# Patient Record
Sex: FEMALE | Race: BLACK OR AFRICAN AMERICAN | Employment: OTHER | URBAN - METROPOLITAN AREA
[De-identification: names, ages, dates, MRNs, and addresses within clinical notes are randomized per-mention and may not be internally consistent; named-entity substitution may affect disease eponyms.]

---

## 2017-03-23 ENCOUNTER — ED HISTORICAL/CONVERTED ENCOUNTER (OUTPATIENT)
Dept: OTHER | Age: 52
End: 2017-03-23

## 2017-09-02 ENCOUNTER — ED HISTORICAL/CONVERTED ENCOUNTER (OUTPATIENT)
Dept: OTHER | Age: 52
End: 2017-09-02

## 2017-12-20 ENCOUNTER — ED HISTORICAL/CONVERTED ENCOUNTER (OUTPATIENT)
Dept: OTHER | Age: 52
End: 2017-12-20

## 2017-12-24 ENCOUNTER — ED HISTORICAL/CONVERTED ENCOUNTER (OUTPATIENT)
Dept: OTHER | Age: 52
End: 2017-12-24

## 2017-12-29 ENCOUNTER — HOSPITAL ENCOUNTER (OUTPATIENT)
Dept: GENERAL RADIOLOGY | Age: 52
Discharge: HOME | End: 2017-12-29
Attending: ORTHOPAEDIC SURGERY
Payer: COMMERCIAL

## 2017-12-29 DIAGNOSIS — M25.552 LEFT HIP PAIN: ICD-10-CM

## 2017-12-29 PROCEDURE — 74011636320 HC RX REV CODE- 636/320: Performed by: RADIOLOGY

## 2017-12-29 PROCEDURE — 74011000250 HC RX REV CODE- 250: Performed by: RADIOLOGY

## 2017-12-29 PROCEDURE — 20610 DRAIN/INJ JOINT/BURSA W/O US: CPT

## 2017-12-29 PROCEDURE — 74011250636 HC RX REV CODE- 250/636: Performed by: RADIOLOGY

## 2017-12-29 RX ORDER — TRIAMCINOLONE ACETONIDE 40 MG/ML
40 INJECTION, SUSPENSION INTRA-ARTICULAR; INTRAMUSCULAR
Status: COMPLETED | OUTPATIENT
Start: 2017-12-29 | End: 2017-12-29

## 2017-12-29 RX ORDER — LIDOCAINE HYDROCHLORIDE 10 MG/ML
10 INJECTION INFILTRATION; PERINEURAL
Status: COMPLETED | OUTPATIENT
Start: 2017-12-29 | End: 2017-12-29

## 2017-12-29 RX ORDER — BUPIVACAINE HYDROCHLORIDE 5 MG/ML
5 INJECTION, SOLUTION EPIDURAL; INTRACAUDAL
Status: COMPLETED | OUTPATIENT
Start: 2017-12-29 | End: 2017-12-29

## 2017-12-29 RX ADMIN — LIDOCAINE HYDROCHLORIDE 10 ML: 10 INJECTION, SOLUTION INFILTRATION; PERINEURAL at 12:36

## 2017-12-29 RX ADMIN — BUPIVACAINE HYDROCHLORIDE 25 MG: 5 INJECTION, SOLUTION EPIDURAL; INTRACAUDAL at 12:36

## 2017-12-29 RX ADMIN — TRIAMCINOLONE ACETONIDE 40 MG: 40 INJECTION, SUSPENSION INTRA-ARTICULAR; INTRAMUSCULAR at 12:36

## 2017-12-29 RX ADMIN — IOHEXOL 10 ML: 300 INJECTION, SOLUTION INTRAVENOUS at 12:36

## 2018-01-22 ENCOUNTER — OP HISTORICAL/CONVERTED ENCOUNTER (OUTPATIENT)
Dept: OTHER | Age: 53
End: 2018-01-22

## 2018-09-21 ENCOUNTER — IP HISTORICAL/CONVERTED ENCOUNTER (OUTPATIENT)
Dept: OTHER | Age: 53
End: 2018-09-21

## 2018-10-23 ENCOUNTER — OP HISTORICAL/CONVERTED ENCOUNTER (OUTPATIENT)
Dept: OTHER | Age: 53
End: 2018-10-23

## 2019-12-09 ENCOUNTER — ED HISTORICAL/CONVERTED ENCOUNTER (OUTPATIENT)
Dept: OTHER | Age: 54
End: 2019-12-09

## 2020-01-07 ENCOUNTER — OP HISTORICAL/CONVERTED ENCOUNTER (OUTPATIENT)
Dept: OTHER | Age: 55
End: 2020-01-07

## 2021-02-17 ENCOUNTER — HOSPITAL ENCOUNTER (EMERGENCY)
Age: 56
Discharge: HOME | End: 2021-02-17
Payer: COMMERCIAL

## 2021-02-17 ENCOUNTER — APPOINTMENT (OUTPATIENT)
Dept: GENERAL RADIOLOGY | Age: 56
End: 2021-02-17
Attending: PHYSICIAN ASSISTANT
Payer: COMMERCIAL

## 2021-02-17 VITALS
RESPIRATION RATE: 17 BRPM | HEIGHT: 65 IN | BODY MASS INDEX: 31.65 KG/M2 | OXYGEN SATURATION: 99 % | HEART RATE: 72 BPM | SYSTOLIC BLOOD PRESSURE: 156 MMHG | TEMPERATURE: 97.9 F | DIASTOLIC BLOOD PRESSURE: 84 MMHG | WEIGHT: 190 LBS

## 2021-02-17 DIAGNOSIS — S16.1XXA STRAIN OF CERVICAL PORTION OF RIGHT TRAPEZIUS MUSCLE: Primary | ICD-10-CM

## 2021-02-17 DIAGNOSIS — M62.838 MUSCLE SPASM: ICD-10-CM

## 2021-02-17 DIAGNOSIS — M19.011 PRIMARY OSTEOARTHRITIS OF RIGHT SHOULDER: ICD-10-CM

## 2021-02-17 PROCEDURE — 73030 X-RAY EXAM OF SHOULDER: CPT

## 2021-02-17 PROCEDURE — 99282 EMERGENCY DEPT VISIT SF MDM: CPT

## 2021-02-17 RX ORDER — METHOCARBAMOL 500 MG/1
500 TABLET, FILM COATED ORAL 3 TIMES DAILY
Qty: 15 TAB | Refills: 0 | Status: SHIPPED | OUTPATIENT
Start: 2021-02-17

## 2021-02-17 NOTE — ED TRIAGE NOTES
Pt complains of right shoulder pain for several days, has history of chronic shoulder pain, denies injury

## 2021-02-17 NOTE — ED PROVIDER NOTES
EMERGENCY DEPARTMENT HISTORY AND PHYSICAL EXAM      Date: 2/17/2021  Patient Name: Annice Claude    History of Presenting Illness     Chief Complaint   Patient presents with    Shoulder Pain       History Provided By: Patient    HPI: Annice Claude, 63 y.o. female with a past medical history significant No significant past medical history presents to the ED with cc of right sided neck pain radiating into the right shoulder onset yesterday. Patient reports sleeping on her couch 2 days ago because she lost power and she believes she may have slept on her neck and shoulder in an abnormal way, causing her pain. She denies other injury or trauma. She has been taking naproxen at home with mild relief of her symptoms. She reports worsening of her symptoms with rotation of her head to the right. She denies numbness, tingling, chest pain, shortness of breath, fever. There are no other complaints, changes, or physical findings at this time. PCP: Amari Rizvi NP    No current facility-administered medications on file prior to encounter. No current outpatient medications on file prior to encounter. Past History     Past Medical History:  No past medical history on file. Past Surgical History:  No past surgical history on file. Family History:  No family history on file. Social History:  Social History     Tobacco Use    Smoking status: Not on file   Substance Use Topics    Alcohol use: Not on file    Drug use: Not on file       Allergies:  No Known Allergies      Review of Systems   Review of Systems   Constitutional: Negative for activity change, chills and fever. HENT: Negative for congestion, ear pain, rhinorrhea, sneezing and sore throat. Eyes: Negative for pain and visual disturbance. Respiratory: Negative for cough and shortness of breath. Cardiovascular: Negative for chest pain. Gastrointestinal: Negative for abdominal pain, diarrhea, nausea and vomiting. Genitourinary: Negative for dysuria and hematuria. Musculoskeletal: Positive for arthralgias, myalgias and neck pain. Negative for gait problem. Skin: Negative for rash. Neurological: Negative for speech difficulty, weakness and headaches. Psychiatric/Behavioral: The patient is not nervous/anxious. All other systems reviewed and are negative. Physical Exam   Physical Exam  Vitals signs and nursing note reviewed. Constitutional:       General: She is not in acute distress. Appearance: Normal appearance. She is not toxic-appearing. HENT:      Head: Normocephalic and atraumatic. Nose: Nose normal.      Mouth/Throat:      Mouth: Mucous membranes are moist.   Eyes:      Extraocular Movements: Extraocular movements intact. Conjunctiva/sclera: Conjunctivae normal.      Pupils: Pupils are equal, round, and reactive to light. Neck:      Musculoskeletal: Normal range of motion. Pain with movement and muscular tenderness present. No spinous process tenderness. Cardiovascular:      Rate and Rhythm: Normal rate. Pulses: Normal pulses. Pulmonary:      Effort: Pulmonary effort is normal. No respiratory distress. Musculoskeletal: Normal range of motion. General: No deformity or signs of injury. Right shoulder: She exhibits normal range of motion, no tenderness, no bony tenderness, no deformity, normal pulse and normal strength. Skin:     General: Skin is warm and dry. Capillary Refill: Capillary refill takes less than 2 seconds. Findings: No rash. Neurological:      General: No focal deficit present. Mental Status: She is alert and oriented to person, place, and time. Cranial Nerves: No cranial nerve deficit. Psychiatric:         Mood and Affect: Mood normal.         Diagnostic Study Results     Labs -   No results found for this or any previous visit (from the past 48 hour(s)).     Radiologic Studies -   Results from [facility] encounter on 02/17/21   XR SHOULDER RT AP/LAT MIN 2 V    Narrative Right shoulder, 3 views, 2/17/2021    History: Pain. Comparison: Including chest 5/29/2015. Findings: The glenohumeral joint demonstrates anatomical alignment. The  clavicle and scapula appear intact. The acromioclavicular and coracoclavicular  joint spaces are is not widened. There is cortical irregularity and small  osteophytes along the acromion process. No acute fracture is identified. Impression Degenerative changes at the [facility] joint. No acute fracture or  dislocation. CT Results  (Last 48 hours)    None          Medical Decision Making   I am the first provider for this patient. I reviewed the vital signs, available nursing notes, past medical history, past surgical history, family history and social history. Vital Signs-Reviewed the patient's vital signs. Patient Vitals for the past 12 hrs:   Temp Pulse Resp BP SpO2   02/17/21 1206    (!) 156/84    02/17/21 1158 97.9 °F (36.6 °C) 72 17  99 %       Records Reviewed: Nursing Notes    Provider Notes (Medical Decision Making):     MDM  Number of Diagnoses or Management Options  Muscle spasm  Primary osteoarthritis of right shoulder  Strain of cervical portion of right trapezius muscle  Diagnosis management comments: 13-year-old female with complaints of right-sided neck pain, cervical spasm noted on exam, painful range of motion. No bony tenderness. Right shoulder x-ray revealing degenerative joint disease. She has naproxen at home which she has been taking with mild relief. We will add muscle relaxer to her regimen. She has been advised to use local heat therapy for relief as well. Voices understanding of plan. Amount and/or Complexity of Data Reviewed  Tests in the radiology section of CPT®: ordered and reviewed        ED Course:   Initial assessment performed.  The patients presenting problems have been discussed, and they are in agreement with the care plan formulated and outlined with them. I have encouraged them to ask questions as they arise throughout their visit. PROCEDURES    Procedures     DISPOSITION    Discharged    PLAN:  1. Discharge Medication List as of 2/17/2021  1:24 PM         2. Follow-up Information     Follow up With Specialties Details Why Contact Nichelle Hdz NP Nurse Practitioner Schedule an appointment as soon as possible for a visit  for follow up from ER visit [address] [facility]  [address] [facility] [address] [phone]      [address] [place] [address] DEPT Emergency Medicine  As needed, If symptoms worsen [address] [facility] [address] [phone]        Return to ED if worse     Diagnosis     Clinical Impression:   1. Strain of cervical portion of right trapezius muscle    2. Muscle spasm    3.  Primary osteoarthritis of right shoulder

## 2021-11-30 ENCOUNTER — TRANSCRIBE ORDER (OUTPATIENT)
Dept: SCHEDULING | Age: 56
End: 2021-11-30

## 2021-11-30 DIAGNOSIS — Z12.31 BREAST CANCER SCREENING BY MAMMOGRAM: Primary | ICD-10-CM

## 2021-12-30 ENCOUNTER — HOSPITAL ENCOUNTER (OUTPATIENT)
Dept: MAMMOGRAPHY | Age: 56
Discharge: HOME | End: 2021-12-30
Attending: NURSE PRACTITIONER
Payer: COMMERCIAL

## 2021-12-30 DIAGNOSIS — Z12.31 BREAST CANCER SCREENING BY MAMMOGRAM: ICD-10-CM

## 2021-12-30 PROCEDURE — 77063 BREAST TOMOSYNTHESIS BI: CPT

## 2024-01-29 ENCOUNTER — TRANSCRIBE ORDERS (OUTPATIENT)
Facility: HOSPITAL | Age: 59
End: 2024-01-29

## 2024-01-29 DIAGNOSIS — Z12.31 VISIT FOR SCREENING MAMMOGRAM: Primary | ICD-10-CM

## 2024-02-05 ENCOUNTER — HOSPITAL ENCOUNTER (OUTPATIENT)
Facility: HOSPITAL | Age: 59
Discharge: HOME | End: 2024-02-08
Payer: COMMERCIAL

## 2024-02-05 DIAGNOSIS — Z12.31 VISIT FOR SCREENING MAMMOGRAM: ICD-10-CM

## 2024-02-05 PROCEDURE — 77063 BREAST TOMOSYNTHESIS BI: CPT

## 2024-04-21 ENCOUNTER — APPOINTMENT (OUTPATIENT)
Facility: HOSPITAL | Age: 59
End: 2024-04-21
Payer: COMMERCIAL

## 2024-04-21 ENCOUNTER — HOSPITAL ENCOUNTER (INPATIENT)
Facility: HOSPITAL | Age: 59
LOS: 4 days | Discharge: HOME | End: 2024-04-25
Attending: STUDENT IN AN ORGANIZED HEALTH CARE EDUCATION/TRAINING PROGRAM | Admitting: INTERNAL MEDICINE
Payer: COMMERCIAL

## 2024-04-21 DIAGNOSIS — K56.609 SBO (SMALL BOWEL OBSTRUCTION) (HCC): Primary | ICD-10-CM

## 2024-04-21 LAB
ALBUMIN SERPL-MCNC: 4.2 G/DL (ref 3.5–5)
ALBUMIN/GLOB SERPL: 1.1 (ref 1.1–2.2)
ALP SERPL-CCNC: 78 U/L (ref 45–117)
ALT SERPL-CCNC: 27 U/L (ref 12–78)
ANION GAP SERPL CALC-SCNC: 5 MMOL/L (ref 5–15)
APPEARANCE UR: CLEAR
AST SERPL W P-5'-P-CCNC: ABNORMAL U/L (ref 15–37)
BACTERIA URNS QL MICRO: NEGATIVE /HPF
BASOPHILS # BLD: 0 K/UL (ref 0–0.1)
BASOPHILS NFR BLD: 0 % (ref 0–1)
BILIRUB SERPL-MCNC: 1.4 MG/DL (ref 0.2–1)
BILIRUB UR QL: NEGATIVE
BUN SERPL-MCNC: 9 MG/DL (ref 6–20)
BUN/CREAT SERPL: 13 (ref 12–20)
CA-I BLD-MCNC: 10 MG/DL (ref 8.5–10.1)
CHLORIDE SERPL-SCNC: 104 MMOL/L (ref 97–108)
CO2 SERPL-SCNC: 24 MMOL/L (ref 21–32)
COLOR UR: NORMAL
CREAT SERPL-MCNC: 0.71 MG/DL (ref 0.55–1.02)
DIFFERENTIAL METHOD BLD: ABNORMAL
EOSINOPHIL # BLD: 0 K/UL (ref 0–0.4)
EOSINOPHIL NFR BLD: 0 % (ref 0–7)
ERYTHROCYTE [DISTWIDTH] IN BLOOD BY AUTOMATED COUNT: 14.6 % (ref 11.5–14.5)
FLUAV AG NPH QL IA: NEGATIVE
FLUBV AG NOSE QL IA: NEGATIVE
GLOBULIN SER CALC-MCNC: 3.8 G/DL (ref 2–4)
GLUCOSE SERPL-MCNC: 130 MG/DL (ref 65–100)
GLUCOSE UR STRIP.AUTO-MCNC: NEGATIVE MG/DL
HCT VFR BLD AUTO: 41.7 % (ref 35–47)
HGB BLD-MCNC: 13.7 G/DL (ref 11.5–16)
HGB UR QL STRIP: NEGATIVE
IMM GRANULOCYTES # BLD AUTO: 0 K/UL (ref 0–0.04)
IMM GRANULOCYTES NFR BLD AUTO: 0 % (ref 0–0.5)
KETONES UR QL STRIP.AUTO: NEGATIVE MG/DL
LEUKOCYTE ESTERASE UR QL STRIP.AUTO: NEGATIVE
LIPASE SERPL-CCNC: 18 U/L (ref 13–75)
LYMPHOCYTES # BLD: 1.3 K/UL (ref 0.8–3.5)
LYMPHOCYTES NFR BLD: 12 % (ref 12–49)
MAGNESIUM SERPL-MCNC: NORMAL MG/DL (ref 1.6–2.4)
MCH RBC QN AUTO: 26.7 PG (ref 26–34)
MCHC RBC AUTO-ENTMCNC: 32.9 G/DL (ref 30–36.5)
MCV RBC AUTO: 81.1 FL (ref 80–99)
MONOCYTES # BLD: 0.3 K/UL (ref 0–1)
MONOCYTES NFR BLD: 3 % (ref 5–13)
NEUTS SEG # BLD: 9.1 K/UL (ref 1.8–8)
NEUTS SEG NFR BLD: 85 % (ref 32–75)
NITRITE UR QL STRIP.AUTO: NEGATIVE
NRBC # BLD: 0 K/UL (ref 0–0.01)
NRBC BLD-RTO: 0 PER 100 WBC
PH UR STRIP: 7 (ref 5–8)
PLATELET # BLD AUTO: 380 K/UL (ref 150–400)
PMV BLD AUTO: 9.5 FL (ref 8.9–12.9)
POTASSIUM SERPL-SCNC: ABNORMAL MMOL/L (ref 3.5–5.1)
PROT SERPL-MCNC: 8 G/DL (ref 6.4–8.2)
PROT UR STRIP-MCNC: NEGATIVE MG/DL
RBC # BLD AUTO: 5.14 M/UL (ref 3.8–5.2)
RBC #/AREA URNS HPF: NORMAL /HPF (ref 0–5)
SARS-COV-2 RDRP RESP QL NAA+PROBE: NOT DETECTED
SODIUM SERPL-SCNC: 133 MMOL/L (ref 136–145)
SP GR UR REFRACTOMETRY: 1.02 (ref 1–1.03)
TROPONIN I SERPL HS-MCNC: <4 NG/L (ref 0–51)
UROBILINOGEN UR QL STRIP.AUTO: 0.1 EU/DL (ref 0.1–1)
WBC # BLD AUTO: 10.8 K/UL (ref 3.6–11)
WBC URNS QL MICRO: NORMAL /HPF (ref 0–4)

## 2024-04-21 PROCEDURE — 1100000000 HC RM PRIVATE

## 2024-04-21 PROCEDURE — 87804 INFLUENZA ASSAY W/OPTIC: CPT

## 2024-04-21 PROCEDURE — 80053 COMPREHEN METABOLIC PANEL: CPT

## 2024-04-21 PROCEDURE — 99221 1ST HOSP IP/OBS SF/LOW 40: CPT | Performed by: SURGERY

## 2024-04-21 PROCEDURE — 96375 TX/PRO/DX INJ NEW DRUG ADDON: CPT

## 2024-04-21 PROCEDURE — 6360000002 HC RX W HCPCS: Performed by: SURGERY

## 2024-04-21 PROCEDURE — 6360000002 HC RX W HCPCS: Performed by: INTERNAL MEDICINE

## 2024-04-21 PROCEDURE — 2580000003 HC RX 258: Performed by: SURGERY

## 2024-04-21 PROCEDURE — 84484 ASSAY OF TROPONIN QUANT: CPT

## 2024-04-21 PROCEDURE — 85025 COMPLETE CBC W/AUTO DIFF WBC: CPT

## 2024-04-21 PROCEDURE — 81003 URINALYSIS AUTO W/O SCOPE: CPT

## 2024-04-21 PROCEDURE — 94761 N-INVAS EAR/PLS OXIMETRY MLT: CPT

## 2024-04-21 PROCEDURE — 71045 X-RAY EXAM CHEST 1 VIEW: CPT

## 2024-04-21 PROCEDURE — 93005 ELECTROCARDIOGRAM TRACING: CPT | Performed by: STUDENT IN AN ORGANIZED HEALTH CARE EDUCATION/TRAINING PROGRAM

## 2024-04-21 PROCEDURE — 83690 ASSAY OF LIPASE: CPT

## 2024-04-21 PROCEDURE — 6360000004 HC RX CONTRAST MEDICATION: Performed by: STUDENT IN AN ORGANIZED HEALTH CARE EDUCATION/TRAINING PROGRAM

## 2024-04-21 PROCEDURE — 96374 THER/PROPH/DIAG INJ IV PUSH: CPT

## 2024-04-21 PROCEDURE — 99285 EMERGENCY DEPT VISIT HI MDM: CPT

## 2024-04-21 PROCEDURE — 2580000003 HC RX 258: Performed by: STUDENT IN AN ORGANIZED HEALTH CARE EDUCATION/TRAINING PROGRAM

## 2024-04-21 PROCEDURE — 74177 CT ABD & PELVIS W/CONTRAST: CPT

## 2024-04-21 PROCEDURE — 36415 COLL VENOUS BLD VENIPUNCTURE: CPT

## 2024-04-21 PROCEDURE — 2580000003 HC RX 258: Performed by: INTERNAL MEDICINE

## 2024-04-21 PROCEDURE — 6360000002 HC RX W HCPCS: Performed by: STUDENT IN AN ORGANIZED HEALTH CARE EDUCATION/TRAINING PROGRAM

## 2024-04-21 PROCEDURE — 83735 ASSAY OF MAGNESIUM: CPT

## 2024-04-21 PROCEDURE — 87635 SARS-COV-2 COVID-19 AMP PRB: CPT

## 2024-04-21 RX ORDER — ACETAMINOPHEN 325 MG/1
650 TABLET ORAL EVERY 6 HOURS PRN
Status: DISCONTINUED | OUTPATIENT
Start: 2024-04-21 | End: 2024-04-25 | Stop reason: HOSPADM

## 2024-04-21 RX ORDER — SODIUM CHLORIDE 0.9 % (FLUSH) 0.9 %
5-40 SYRINGE (ML) INJECTION PRN
Status: DISCONTINUED | OUTPATIENT
Start: 2024-04-21 | End: 2024-04-25 | Stop reason: HOSPADM

## 2024-04-21 RX ORDER — LABETALOL HYDROCHLORIDE 5 MG/ML
10 INJECTION, SOLUTION INTRAVENOUS EVERY 4 HOURS PRN
Status: DISCONTINUED | OUTPATIENT
Start: 2024-04-21 | End: 2024-04-25 | Stop reason: HOSPADM

## 2024-04-21 RX ORDER — POTASSIUM CHLORIDE 7.45 MG/ML
10 INJECTION INTRAVENOUS PRN
Status: DISCONTINUED | OUTPATIENT
Start: 2024-04-21 | End: 2024-04-25 | Stop reason: HOSPADM

## 2024-04-21 RX ORDER — ONDANSETRON 4 MG/1
4 TABLET, ORALLY DISINTEGRATING ORAL EVERY 8 HOURS PRN
Status: DISCONTINUED | OUTPATIENT
Start: 2024-04-21 | End: 2024-04-25 | Stop reason: HOSPADM

## 2024-04-21 RX ORDER — SODIUM CHLORIDE 9 MG/ML
INJECTION, SOLUTION INTRAVENOUS CONTINUOUS
Status: DISCONTINUED | OUTPATIENT
Start: 2024-04-21 | End: 2024-04-23

## 2024-04-21 RX ORDER — ONDANSETRON 2 MG/ML
4 INJECTION INTRAMUSCULAR; INTRAVENOUS EVERY 6 HOURS PRN
Status: DISCONTINUED | OUTPATIENT
Start: 2024-04-21 | End: 2024-04-25 | Stop reason: HOSPADM

## 2024-04-21 RX ORDER — SODIUM CHLORIDE 0.9 % (FLUSH) 0.9 %
5-40 SYRINGE (ML) INJECTION EVERY 12 HOURS SCHEDULED
Status: DISCONTINUED | OUTPATIENT
Start: 2024-04-21 | End: 2024-04-25 | Stop reason: HOSPADM

## 2024-04-21 RX ORDER — ACETAMINOPHEN 650 MG/1
650 SUPPOSITORY RECTAL EVERY 6 HOURS PRN
Status: DISCONTINUED | OUTPATIENT
Start: 2024-04-21 | End: 2024-04-25 | Stop reason: HOSPADM

## 2024-04-21 RX ORDER — MORPHINE SULFATE 4 MG/ML
4 INJECTION, SOLUTION INTRAMUSCULAR; INTRAVENOUS EVERY 4 HOURS PRN
Status: DISPENSED | OUTPATIENT
Start: 2024-04-21 | End: 2024-04-23

## 2024-04-21 RX ORDER — ONDANSETRON 2 MG/ML
4 INJECTION INTRAMUSCULAR; INTRAVENOUS ONCE
Status: COMPLETED | OUTPATIENT
Start: 2024-04-21 | End: 2024-04-21

## 2024-04-21 RX ORDER — 0.9 % SODIUM CHLORIDE 0.9 %
1000 INTRAVENOUS SOLUTION INTRAVENOUS ONCE
Status: COMPLETED | OUTPATIENT
Start: 2024-04-21 | End: 2024-04-21

## 2024-04-21 RX ORDER — MORPHINE SULFATE 2 MG/ML
2 INJECTION, SOLUTION INTRAMUSCULAR; INTRAVENOUS EVERY 4 HOURS PRN
Status: ACTIVE | OUTPATIENT
Start: 2024-04-21 | End: 2024-04-23

## 2024-04-21 RX ORDER — KETOROLAC TROMETHAMINE 15 MG/ML
15 INJECTION, SOLUTION INTRAMUSCULAR; INTRAVENOUS EVERY 6 HOURS
Status: DISPENSED | OUTPATIENT
Start: 2024-04-21 | End: 2024-04-24

## 2024-04-21 RX ORDER — POTASSIUM CHLORIDE 20 MEQ/1
40 TABLET, EXTENDED RELEASE ORAL PRN
Status: DISCONTINUED | OUTPATIENT
Start: 2024-04-21 | End: 2024-04-25 | Stop reason: HOSPADM

## 2024-04-21 RX ORDER — POLYETHYLENE GLYCOL 3350 17 G/17G
17 POWDER, FOR SOLUTION ORAL DAILY PRN
Status: DISCONTINUED | OUTPATIENT
Start: 2024-04-21 | End: 2024-04-25 | Stop reason: HOSPADM

## 2024-04-21 RX ORDER — SODIUM CHLORIDE 9 MG/ML
INJECTION, SOLUTION INTRAVENOUS PRN
Status: DISCONTINUED | OUTPATIENT
Start: 2024-04-21 | End: 2024-04-25 | Stop reason: HOSPADM

## 2024-04-21 RX ORDER — MORPHINE SULFATE 4 MG/ML
4 INJECTION, SOLUTION INTRAMUSCULAR; INTRAVENOUS
Status: COMPLETED | OUTPATIENT
Start: 2024-04-21 | End: 2024-04-21

## 2024-04-21 RX ORDER — MORPHINE SULFATE 4 MG/ML
4 INJECTION, SOLUTION INTRAMUSCULAR; INTRAVENOUS ONCE
Status: COMPLETED | OUTPATIENT
Start: 2024-04-21 | End: 2024-04-21

## 2024-04-21 RX ORDER — MAGNESIUM SULFATE IN WATER 40 MG/ML
2000 INJECTION, SOLUTION INTRAVENOUS PRN
Status: DISCONTINUED | OUTPATIENT
Start: 2024-04-21 | End: 2024-04-25 | Stop reason: HOSPADM

## 2024-04-21 RX ADMIN — SODIUM CHLORIDE: 9 INJECTION, SOLUTION INTRAVENOUS at 10:21

## 2024-04-21 RX ADMIN — MORPHINE SULFATE 4 MG: 4 INJECTION INTRAVENOUS at 13:46

## 2024-04-21 RX ADMIN — MORPHINE SULFATE 4 MG: 4 INJECTION INTRAVENOUS at 17:28

## 2024-04-21 RX ADMIN — MORPHINE SULFATE 4 MG: 4 INJECTION INTRAVENOUS at 06:49

## 2024-04-21 RX ADMIN — SODIUM CHLORIDE, PRESERVATIVE FREE 10 ML: 5 INJECTION INTRAVENOUS at 21:37

## 2024-04-21 RX ADMIN — SODIUM CHLORIDE, PRESERVATIVE FREE 10 ML: 5 INJECTION INTRAVENOUS at 10:12

## 2024-04-21 RX ADMIN — MORPHINE SULFATE 4 MG: 4 INJECTION INTRAVENOUS at 03:58

## 2024-04-21 RX ADMIN — ONDANSETRON 4 MG: 2 INJECTION INTRAMUSCULAR; INTRAVENOUS at 03:58

## 2024-04-21 RX ADMIN — SODIUM CHLORIDE: 9 INJECTION, SOLUTION INTRAVENOUS at 19:29

## 2024-04-21 RX ADMIN — MORPHINE SULFATE 4 MG: 4 INJECTION INTRAVENOUS at 10:09

## 2024-04-21 RX ADMIN — IOPAMIDOL 100 ML: 755 INJECTION, SOLUTION INTRAVENOUS at 05:05

## 2024-04-21 RX ADMIN — SODIUM CHLORIDE 1000 ML: 9 INJECTION, SOLUTION INTRAVENOUS at 03:57

## 2024-04-21 RX ADMIN — KETOROLAC TROMETHAMINE 15 MG: 15 INJECTION, SOLUTION INTRAMUSCULAR; INTRAVENOUS at 18:12

## 2024-04-21 ASSESSMENT — PAIN - FUNCTIONAL ASSESSMENT: PAIN_FUNCTIONAL_ASSESSMENT: 0-10

## 2024-04-21 ASSESSMENT — PAIN DESCRIPTION - LOCATION
LOCATION: ABDOMEN

## 2024-04-21 ASSESSMENT — LIFESTYLE VARIABLES
HOW OFTEN DO YOU HAVE A DRINK CONTAINING ALCOHOL: 2-4 TIMES A MONTH
HOW MANY STANDARD DRINKS CONTAINING ALCOHOL DO YOU HAVE ON A TYPICAL DAY: 1 OR 2

## 2024-04-21 ASSESSMENT — PAIN SCALES - GENERAL
PAINLEVEL_OUTOF10: 5
PAINLEVEL_OUTOF10: 8
PAINLEVEL_OUTOF10: 8
PAINLEVEL_OUTOF10: 10
PAINLEVEL_OUTOF10: 9

## 2024-04-21 ASSESSMENT — PAIN DESCRIPTION - DESCRIPTORS: DESCRIPTORS: CRAMPING

## 2024-04-21 NOTE — PROGRESS NOTES
4 Eyes Skin Assessment     NAME:  Arminda Can  YOB: 1965  MEDICAL RECORD NUMBER:  [id number]    The patient is being assessed for  Admission    I agree that at least one RN has performed a thorough Head to Toe Skin Assessment on the patient. ALL assessment sites listed below have been assessed.      Areas assessed by both nurses:    Head, Face, Ears, Shoulders, Back, Chest, Arms, Elbows, Hands, Sacrum. Buttock, Coccyx, Ischium, Legs. Feet and Heels, and Under Medical Devices         Does the Patient have a Wound? No noted wound(s)       Maycol Prevention initiated by RN: Yes  Wound Care Orders initiated by RN: No    Pressure Injury (Stage 3,4, Unstageable, DTI, NWPT, and Complex wounds) if present, place Wound referral order by RN under : No    New Ostomies, if present place, Ostomy referral order under : No     Nurse 1 eSignature: Electronically signed by Nena Harrington RN on 4/21/24 at 7:41 AM EDT    **SHARE this note so that the co-signing nurse can place an eSignature**    Nurse 2 eSignature: Electronically signed by Lynnette Moore RN on 4/21/24 at 9:29 AM EDT

## 2024-04-21 NOTE — H&P
History & Physical    Primary Care Provider: Fouzia Garcia APRN - NP    Chief complaint:   Chief Complaint   Patient presents with    Abdominal Pain        History of Presenting Illness:   Arminda Can is a 58 y.o. female with PMH of hypertension and history of hysterectomy. Presented to the ED with chief complaint of abdominal pain. Started last night, generalized abdominal pain, severe in intensity. Associated with nausea and vomiting, also reports has loose stool. No fever or chills.     In the ED, vital signs stable. Mild hyponatremia, potassium hemolyzed, repeat pending. CT abdomen showed SBO       Chart review: none          Past Medical History:   Diagnosis Date    Hypertension         Past Surgical History:   Procedure Laterality Date    BREAST REDUCTION SURGERY  2001    HYSTERECTOMY (CERVIX STATUS UNKNOWN)         History reviewed. No pertinent family history.     Social History     Socioeconomic History    Marital status: Single     Spouse name: None    Number of children: None    Years of education: None    Highest education level: None   Tobacco Use    Smoking status: Never    Smokeless tobacco: Never   Vaping Use    Vaping Use: Never used   Substance and Sexual Activity    Alcohol use: Yes     Comment: 3 x/monith    Drug use: Not Currently        PTA medications and allergies per EMR.     Objective:     Physical Exam:     /89   Pulse 74   Temp 98.1 °F (36.7 °C) (Oral)   Resp 18   Ht 1.651 m (5' 5\")   Wt 81.6 kg (180 lb)   SpO2 99%   BMI 29.95 kg/m²    O2 Device: None (Room air)    General: Alert, cooperative, no distress after getting morphine   Head & neck: Normocephalic, without obvious abnormality, atraumatic. Neck supple, symmetrical, trachea midline.   Eyes: Conjunctivae/corneas clear. PERRL, EOMs intact.  Oral: Lips, mucosa, and tongue normal.   Lungs: Clear to auscultation bilaterally.   Heart: Regular rate and rhythm, S1, S2 normal, no murmur, click, rub or

## 2024-04-21 NOTE — PLAN OF CARE
Problem: Discharge Planning  Goal: Discharge to home or other facility with appropriate resources  Outcome: Progressing  Flowsheets (Taken 4/21/2024 1316)  Discharge to home or other facility with appropriate resources:   Identify barriers to discharge with patient and caregiver   Identify discharge learning needs (meds, wound care, etc)   Refer to discharge planning if patient needs post-hospital services based on physician order or complex needs related to functional status, cognitive ability or social support system   Arrange for needed discharge resources and transportation as appropriate   Arrange for interpreters to assist at discharge as needed

## 2024-04-21 NOTE — PROGRESS NOTES
Hospitalist Progress Note    NAME:   Arminda Can   : 1965   MRN: [id number]     Date/Time: 2024 3:43 PM  Patient PCP: Fouzia Garcia APRN - NP    Estimated discharge date:> 48 hours  Barriers: Surgical consult, return of bowel function      HOSPITAL COURSE:    Arminda Can is a 58 y.o. female with hypertension and history of hysterectomy who was admitted to the hospital with a chief complaint of abdominal pain.  Symptoms started 24 PTA.  Associated with nausea and vomiting, and loose stools. No fever or chills. No recent sick exposures or contacts. On arrival patient was hemodynamically stable. Labs showed hyponatremia. Potassium hemolyzed, repeat labs pending. CT abdomen showed SBO. Surgery consulted.      Assessment / Plan:    Small bowel obstruction  - Surgery following, appreciate recommendations  - Continue bowel rest, keep NPO  - NGT in place  - Continue IVF infusion  -  IV morphine  PRN     Hypertension:   - blood pressure acceptable  - hold home medications due to NPO  - IV labetalol PRN           Medical Decision Making:     [] High (any 2)    A. Problems (any 1)  [x] Acute/Chronic Illness/injury posing threat to life or bodily function:    [] Severe exacerbation of chronic illness:    ---------------------------------------------------------------------  B. Risk of Treatment (any 1)   [] Drugs/treatments that require intensive monitoring for toxicity include:    [] IV ABX requiring serial renal monitoring for nephrotoxicity:     [x] IV Narcotic analgesia for adverse drug reaction  [] Aggressive IV diuresis requiring serial monitoring for renal impairment and electrolyte derangements  [] Critical electrolyte abnormalities requiring IV replacement and close serial monitoring  [] Insulin - monitoring serial FSBS for Hypoglycemic adverse drug reaction  [] Other -   [] Change in code status:    [] Decision to escalate care:    [x] Major surgery/procedure with associated risk factors:   Rubi Gee, APRN - NP

## 2024-04-21 NOTE — ED PROVIDER NOTES
file   Alcohol Use: Not At Risk (4/21/2024)    AUDIT-C     Frequency of Alcohol Consumption: 2-4 times a month     Average Number of Drinks: 1 or 2     Frequency of Binge Drinking: Never   Financial Resource Strain: Not on file   Food Insecurity: Not on file   Transportation Needs: Not on file   Physical Activity: Not on file   Stress: Not on file   Social Connections: Not on file   Intimate Partner Violence: Not on file   Depression: Not on file   Housing Stability: Not on file   Interpersonal Safety: Not At Risk (4/21/2024)    Interpersonal Safety Domain Source: IP Abuse Screening     Physical abuse: Denies     Verbal abuse: Denies     Emotional abuse: Denies     Financial abuse: Denies     Sexual abuse: Denies   Utilities: Not on file       Physical Exam     Vitals:  I reviewed the patient's vital signs  Vitals:    04/21/24 0419 04/21/24 0429 04/21/24 0449 04/21/24 0519   BP: (!) 151/89 (!) 145/84 (!) 140/85 135/89   Pulse: 74 70 71 74   Resp: 18 16 14 18   Temp:       TempSrc:       SpO2: 93% 94% 99% 99%   Weight:       Height:           Physical Exam  Vitals and nursing note reviewed.   HENT:      Nose: Nose normal.      Mouth/Throat:      Mouth: Mucous membranes are moist.   Cardiovascular:      Rate and Rhythm: Normal rate and regular rhythm.      Pulses: Normal pulses.   Pulmonary:      Effort: Pulmonary effort is normal.      Breath sounds: Normal breath sounds.   Abdominal:      General: There is no distension.      Palpations: Abdomen is soft.      Tenderness: There is generalized abdominal tenderness. There is guarding. There is no right CVA tenderness or left CVA tenderness.      Hernia: No hernia is present.      Comments: Pulls to seated position without pain.   Skin:     General: Skin is warm.      Coloration: Skin is not pale.   Neurological:      General: No focal deficit present.      Mental Status: She is alert.            Medical Decision Making     Records Reviewed: Prior medical records and  Considerations:      Smoking Cessation:Not Applicable    DISCHARGE PLAN:  1. There are no discharge medications for this patient.     2. No follow-up provider specified.  3.  Return to ED if worse    4.      Medication List      You have not been prescribed any medications.       5. Discontinued Medications: There are no discharge medications for this patient.      Procedures     Unless otherwise noted below, none.    Performed by: Sae Rico MD   Procedures      Critical Care Time     CRITICAL CARE NOTE :  5:50 AM    Critical care time is being documented due to the fulfillment of at least one of the following:    - Critical conditions: condition that acutely impairs one or more vital organ systems such that there is a high probability of imminent or life-threatening deterioration in condition. Examples are diagnoses including but not limited to Afib RVR, DKA, PE, Etc..    - Critical interventions: an action whose failure to initiate would likely allow a sudden, clinically significant decline in the patient's condition. These include  Requirement of transfer or ICU admission  Contemplation or provision of tPA  Drip initiation (pressors, antiarrhythmics, heparin, etc.)  Antidotes given (narcan, charcoal, epi for anaphylaxis, etc..)  >=2L fluid bolus  >=3 Duonebs  >1 IV/IM doses of sedatives, antiepileptics, BP meds, rate control meds, adenosine.  Procedures that are suggestive of critical care: chest tubes, cardioversion, BiPAP, IO, etc..    Critical care time is documented based on continuous or non-continuous provision of care that includes face-to-face time, placing orders, chart review, documentation, discussion with consultants, discussion with family. This time calculation is a best approximation and does not include time spent on CPR, EKG interpretation, central line placement, intubation, laceration repairs, and other separately billed procedures.    Amount of Critical Care Time: 32 minutes    Details

## 2024-04-21 NOTE — ED NOTES
ED TO INPATIENT SBAR HANDOFF    Patient Name: Arminda Can   Preferred Name: Arminda  : 1965  58 y.o.   Family/Caregiver Present: no   Code Status Order: Full Code  PO Status: NPO:Yes  Telemetry Order:   C-SSRS: Risk of Suicide: No Risk  Sitter no   Restraints:     Sepsis Risk Score Sepsis Risk Score: 0.66    Situation  Chief Complaint   Patient presents with    Abdominal Pain     Brief Description of Patient's Condition: pt with abd pain and small bowel obs   Mental Status: oriented, alert, coherent, logical, thought processes intact, and able to concentrate and follow conversation  Arrived from:Home  Imaging:   CT ABDOMEN PELVIS W IV CONTRAST Additional Contrast? None   Final Result   Small bowel obstruction with a thickened loop of small bowel in the right lower   quadrant serving as the distal ileum and transition point. Small amount of   ascites.      XR CHEST PORTABLE    (Results Pending)     Abnormal labs:   Abnormal Labs Reviewed   CBC WITH AUTO DIFFERENTIAL - Abnormal; Notable for the following components:       Result Value    RDW 14.6 (*)     Neutrophils % 85 (*)     Monocytes % 3 (*)     Neutrophils Absolute 9.1 (*)     All other components within normal limits   COMPREHENSIVE METABOLIC PANEL - Abnormal; Notable for the following components:    Sodium 133 (*)     Glucose 130 (*)     Total Bilirubin 1.4 (*)     All other components within normal limits       Background  Allergies: No Known Allergies  History:   Past Medical History:   Diagnosis Date    Hypertension        Assessment  Vitals: MEWS Score: 1  Level of Consciousness: Alert (0)   Vitals:    24 0419 24 0429 24 0449 24 0519   BP: (!) 151/89 (!) 145/84 (!) 140/85 135/89   Pulse: 74 70 71 74   Resp: 18 16 14 18   Temp:       TempSrc:       SpO2: 93% 94% 99% 99%   Weight:       Height:         Deterioration Index (DI): Deterioration Index: 20.75  Deterioration Index (DI) Interventions Performed:    O2 Flow Rate:     O2 Device: O2 Device: None (Room air)  Cardiac Rhythm:    Critical Lab Results: [unfilled]  Cultures: Cultures:Flu and Covid  NIH Score: NIH     Active LDA's:   Peripheral IV 04/21/24 Right Forearm (Active)     Active Central Lines:                          Active Wounds:    Active Garcia's:    Active Feeding Tubes:      Administered Medications:   Medications   sodium chloride flush 0.9 % injection 5-40 mL (has no administration in time range)   sodium chloride flush 0.9 % injection 5-40 mL (has no administration in time range)   0.9 % sodium chloride infusion (has no administration in time range)   potassium chloride (KLOR-CON M) extended release tablet 40 mEq (has no administration in time range)     Or   potassium bicarb-citric acid (EFFER-K) effervescent tablet 40 mEq (has no administration in time range)     Or   potassium chloride 10 mEq/100 mL IVPB (Peripheral Line) (has no administration in time range)   magnesium sulfate 2000 mg in 50 mL IVPB premix (has no administration in time range)   ondansetron (ZOFRAN-ODT) disintegrating tablet 4 mg (has no administration in time range)     Or   ondansetron (ZOFRAN) injection 4 mg (has no administration in time range)   polyethylene glycol (GLYCOLAX) packet 17 g (has no administration in time range)   acetaminophen (TYLENOL) tablet 650 mg (has no administration in time range)     Or   acetaminophen (TYLENOL) suppository 650 mg (has no administration in time range)   0.9 % sodium chloride infusion (has no administration in time range)   labetalol (NORMODYNE;TRANDATE) injection 10 mg (has no administration in time range)   morphine (PF) injection 4 mg (has no administration in time range)   morphine (PF) injection 2 mg (has no administration in time range)   ondansetron (ZOFRAN) injection 4 mg (4 mg IntraVENous Given 4/21/24 0358)   morphine (PF) injection 4 mg (4 mg IntraVENous Given 4/21/24 0358)   sodium chloride 0.9 % bolus 1,000 mL (0 mLs IntraVENous Stopped

## 2024-04-22 ENCOUNTER — APPOINTMENT (OUTPATIENT)
Facility: HOSPITAL | Age: 59
End: 2024-04-22
Payer: COMMERCIAL

## 2024-04-22 LAB
ANION GAP SERPL CALC-SCNC: 4 MMOL/L (ref 5–15)
BASOPHILS # BLD: 0 K/UL (ref 0–0.1)
BASOPHILS NFR BLD: 1 % (ref 0–1)
BUN SERPL-MCNC: 10 MG/DL (ref 6–20)
BUN/CREAT SERPL: 14 (ref 12–20)
CA-I BLD-MCNC: 9 MG/DL (ref 8.5–10.1)
CHLORIDE SERPL-SCNC: 110 MMOL/L (ref 97–108)
CO2 SERPL-SCNC: 28 MMOL/L (ref 21–32)
CREAT SERPL-MCNC: 0.71 MG/DL (ref 0.55–1.02)
DIFFERENTIAL METHOD BLD: ABNORMAL
EKG ATRIAL RATE: 76 BPM
EKG DIAGNOSIS: NORMAL
EKG P AXIS: 32 DEGREES
EKG P-R INTERVAL: 168 MS
EKG Q-T INTERVAL: 406 MS
EKG QRS DURATION: 80 MS
EKG QTC CALCULATION (BAZETT): 456 MS
EKG R AXIS: 7 DEGREES
EKG T AXIS: 13 DEGREES
EKG VENTRICULAR RATE: 76 BPM
EOSINOPHIL # BLD: 0.1 K/UL (ref 0–0.4)
EOSINOPHIL NFR BLD: 1 % (ref 0–7)
ERYTHROCYTE [DISTWIDTH] IN BLOOD BY AUTOMATED COUNT: 15.3 % (ref 11.5–14.5)
GLUCOSE SERPL-MCNC: 100 MG/DL (ref 65–100)
HCT VFR BLD AUTO: 35.4 % (ref 35–47)
HGB BLD-MCNC: 11.7 G/DL (ref 11.5–16)
IMM GRANULOCYTES # BLD AUTO: 0 K/UL (ref 0–0.04)
IMM GRANULOCYTES NFR BLD AUTO: 0 % (ref 0–0.5)
LYMPHOCYTES # BLD: 2.2 K/UL (ref 0.8–3.5)
LYMPHOCYTES NFR BLD: 33 % (ref 12–49)
MCH RBC QN AUTO: 27.5 PG (ref 26–34)
MCHC RBC AUTO-ENTMCNC: 33.1 G/DL (ref 30–36.5)
MCV RBC AUTO: 83.1 FL (ref 80–99)
MONOCYTES # BLD: 0.9 K/UL (ref 0–1)
MONOCYTES NFR BLD: 14 % (ref 5–13)
NEUTS SEG # BLD: 3.4 K/UL (ref 1.8–8)
NEUTS SEG NFR BLD: 51 % (ref 32–75)
NRBC # BLD: 0 K/UL (ref 0–0.01)
NRBC BLD-RTO: 0 PER 100 WBC
PLATELET # BLD AUTO: 289 K/UL (ref 150–400)
PMV BLD AUTO: 9.5 FL (ref 8.9–12.9)
POTASSIUM SERPL-SCNC: 3.5 MMOL/L (ref 3.5–5.1)
RBC # BLD AUTO: 4.26 M/UL (ref 3.8–5.2)
SODIUM SERPL-SCNC: 142 MMOL/L (ref 136–145)
WBC # BLD AUTO: 6.6 K/UL (ref 3.6–11)

## 2024-04-22 PROCEDURE — 2580000003 HC RX 258: Performed by: SURGERY

## 2024-04-22 PROCEDURE — 6360000002 HC RX W HCPCS: Performed by: SURGERY

## 2024-04-22 PROCEDURE — 6360000004 HC RX CONTRAST MEDICATION: Performed by: SURGERY

## 2024-04-22 PROCEDURE — 1100000000 HC RM PRIVATE

## 2024-04-22 PROCEDURE — 99231 SBSQ HOSP IP/OBS SF/LOW 25: CPT | Performed by: SURGERY

## 2024-04-22 PROCEDURE — 80048 BASIC METABOLIC PNL TOTAL CA: CPT

## 2024-04-22 PROCEDURE — 74018 RADEX ABDOMEN 1 VIEW: CPT

## 2024-04-22 PROCEDURE — 85025 COMPLETE CBC W/AUTO DIFF WBC: CPT

## 2024-04-22 PROCEDURE — 6360000002 HC RX W HCPCS: Performed by: INTERNAL MEDICINE

## 2024-04-22 PROCEDURE — 36415 COLL VENOUS BLD VENIPUNCTURE: CPT

## 2024-04-22 PROCEDURE — 2580000003 HC RX 258: Performed by: INTERNAL MEDICINE

## 2024-04-22 RX ADMIN — MORPHINE SULFATE 4 MG: 4 INJECTION INTRAVENOUS at 22:05

## 2024-04-22 RX ADMIN — KETOROLAC TROMETHAMINE 15 MG: 15 INJECTION, SOLUTION INTRAMUSCULAR; INTRAVENOUS at 06:41

## 2024-04-22 RX ADMIN — MORPHINE SULFATE 4 MG: 4 INJECTION INTRAVENOUS at 14:29

## 2024-04-22 RX ADMIN — DIATRIZOATE MEGLUMINE AND DIATRIZOATE SODIUM 90 ML: 660; 100 LIQUID ORAL; RECTAL at 06:40

## 2024-04-22 RX ADMIN — KETOROLAC TROMETHAMINE 15 MG: 15 INJECTION, SOLUTION INTRAMUSCULAR; INTRAVENOUS at 01:20

## 2024-04-22 RX ADMIN — SODIUM CHLORIDE: 9 INJECTION, SOLUTION INTRAVENOUS at 05:36

## 2024-04-22 RX ADMIN — SODIUM CHLORIDE, PRESERVATIVE FREE 10 ML: 5 INJECTION INTRAVENOUS at 22:17

## 2024-04-22 RX ADMIN — ONDANSETRON 4 MG: 2 INJECTION INTRAMUSCULAR; INTRAVENOUS at 06:54

## 2024-04-22 RX ADMIN — SODIUM CHLORIDE, PRESERVATIVE FREE 10 ML: 5 INJECTION INTRAVENOUS at 10:00

## 2024-04-22 RX ADMIN — KETOROLAC TROMETHAMINE 15 MG: 15 INJECTION, SOLUTION INTRAMUSCULAR; INTRAVENOUS at 18:16

## 2024-04-22 RX ADMIN — MORPHINE SULFATE 4 MG: 4 INJECTION INTRAVENOUS at 11:04

## 2024-04-22 RX ADMIN — SODIUM CHLORIDE: 9 INJECTION, SOLUTION INTRAVENOUS at 14:31

## 2024-04-22 RX ADMIN — KETOROLAC TROMETHAMINE 15 MG: 15 INJECTION, SOLUTION INTRAMUSCULAR; INTRAVENOUS at 13:13

## 2024-04-22 ASSESSMENT — PAIN SCALES - GENERAL
PAINLEVEL_OUTOF10: 3
PAINLEVEL_OUTOF10: 7
PAINLEVEL_OUTOF10: 5
PAINLEVEL_OUTOF10: 7
PAINLEVEL_OUTOF10: 6
PAINLEVEL_OUTOF10: 6
PAINLEVEL_OUTOF10: 7

## 2024-04-22 ASSESSMENT — PAIN - FUNCTIONAL ASSESSMENT
PAIN_FUNCTIONAL_ASSESSMENT: PREVENTS OR INTERFERES SOME ACTIVE ACTIVITIES AND ADLS
PAIN_FUNCTIONAL_ASSESSMENT: ACTIVITIES ARE NOT PREVENTED

## 2024-04-22 ASSESSMENT — PAIN DESCRIPTION - ORIENTATION
ORIENTATION: ANTERIOR
ORIENTATION: MID
ORIENTATION: ANTERIOR
ORIENTATION: UPPER;LOWER

## 2024-04-22 ASSESSMENT — ENCOUNTER SYMPTOMS
WHEEZING: 0
ALLERGIC/IMMUNOLOGIC NEGATIVE: 1
VOMITING: 1
COUGH: 0
RESPIRATORY NEGATIVE: 1
TROUBLE SWALLOWING: 0
SHORTNESS OF BREATH: 0
VOMITING: 0
ANAL BLEEDING: 0
ABDOMINAL DISTENTION: 0
SORE THROAT: 0
NAUSEA: 1
EYES NEGATIVE: 1
RHINORRHEA: 0
RECTAL PAIN: 1
CONSTIPATION: 0
BACK PAIN: 0
BLOOD IN STOOL: 0
DIARRHEA: 0
ABDOMINAL PAIN: 1

## 2024-04-22 ASSESSMENT — PAIN DESCRIPTION - DESCRIPTORS
DESCRIPTORS: ACHING

## 2024-04-22 ASSESSMENT — PAIN SCALES - WONG BAKER: WONGBAKER_NUMERICALRESPONSE: HURTS A LITTLE BIT

## 2024-04-22 ASSESSMENT — PAIN DESCRIPTION - LOCATION
LOCATION: ABDOMEN

## 2024-04-22 NOTE — CARE COORDINATION
04/22/24 1409   Service Assessment   Patient Orientation Alert and Oriented   Cognition Alert   History Provided By Patient   Primary Caregiver Self   Accompanied By/Relationship alone   Support Systems Family Members   Patient's Healthcare Decision Maker is: Named in Scanned ACP Document   PCP Verified by CM Yes   Last Visit to PCP Within last 3 months   Prior Functional Level Independent in ADLs/IADLs   Current Functional Level Independent in ADLs/IADLs   Can patient return to prior living arrangement Yes   Ability to make needs known: Good   Family able to assist with home care needs: Yes   Would you like for me to discuss the discharge plan with any other family members/significant others, and if so, who? Yes   Financial Resources Other (Comment)  (phillip)   Community Resources None     Lives at address on file with brother. Independent with ADLs/IADLs. No DME. No transport issues. Uses [facility] on [address] for RX.    Current dc plan is home self care once clinically stable.

## 2024-04-22 NOTE — PLAN OF CARE
Problem: Discharge Planning  Goal: Discharge to home or other facility with appropriate resources  4/22/2024 0110 by Ted Gaspar RN  Outcome: Progressing  4/21/2024 1316 by Lynnette Moore RN  Outcome: Progressing  Flowsheets (Taken 4/21/2024 1316)  Discharge to home or other facility with appropriate resources:   Identify barriers to discharge with patient and caregiver   Identify discharge learning needs (meds, wound care, etc)   Refer to discharge planning if patient needs post-hospital services based on physician order or complex needs related to functional status, cognitive ability or social support system   Arrange for needed discharge resources and transportation as appropriate   Arrange for interpreters to assist at discharge as needed     Problem: Pain  Goal: Verbalizes/displays adequate comfort level or baseline comfort level  4/22/2024 0110 by Ted Gaspar, RN  Outcome: Progressing  4/21/2024 1316 by Lynnette Moore RN  Outcome: Progressing     Problem: ABCDS Injury Assessment  Goal: Absence of physical injury  Outcome: Progressing

## 2024-04-22 NOTE — PROGRESS NOTES
Hospitalist Progress Note    NAME:   Arminda Can   : 1965   MRN: [id number]     Date/Time: 2024 3:33 PM  Patient PCP: Fouzia Garcia APRN - NP    Estimated discharge date: 48 hours  Barriers: Gastrografin challenge, general surgery clearance    Hospital course:  Patient is a 58-year-old female with past medical history of hypertension and hysterectomy who was admitted on 2024 with a small bowel obstruction.  CT of the abdomen pelvis with IV contrast revealed small bowel obstruction with thickened loop of small bowel in the right lower quadrant serving as the distal ileum and transition point.  Small bowel ascites.  General surgery consulted and NG tube placed.  CXR after NG tube placement revealed NG tube tip extends to the stomach.  Lungs are clear.  General surgery evaluated patient and recommended n.p.o. with IV hydration.  Continue NG tube decompression with serial abdominal exams.  Gastrografin challenge today    Assessment / Plan:  Small bowel obstruction  -CT of the abdomen pelvis with IV contrast revealed above  -Continue n.p.o. with IV fluids  -NG tube in place  -IV morphine as needed  -General surgery evaluated patient recommended n.p.o. with IV hydration.  Continue NG tube decompression with serial abdominal exams.  Gastrografin challenge today    Hypertension  -Blood pressure remains acceptable  -Will hold p.o. home medication due to n.p.o. status  -IV labetalol as needed    Medical Decision Making:   I personally reviewed labs: CBC BMP  I personally reviewed imaging: CT of the abdomen pelvis with IV contrast  Toxic drug monitoring: None  Discussed case with: Patient, general surgeon and RN    Code Status: Full  DVT Prophylaxis: Not indicated  GI Prophylaxis: Not indicated    Subjective:     Chief Complaint / Reason for Physician Visit  Patient evaluated at bedside this morning.  Patient reports continued abdominal pain and nausea with NG tube currently clamped for

## 2024-04-22 NOTE — CONSULTS
Objective:     Vitals:    04/21/24 0718 04/21/24 1039 04/21/24 1400 04/21/24 1929   BP: (!) 146/88  126/89 120/80   Pulse: 66  63 65   Resp: 20 18 18 18   Temp: 98.2 °F (36.8 °C)  97.7 °F (36.5 °C) 98.2 °F (36.8 °C)   TempSrc: Oral  Oral Oral   SpO2: 99%  97% 96%   Weight:       Height:            Physical Exam  Constitutional:       Appearance: Normal appearance.   HENT:      Head: Normocephalic and atraumatic.   Cardiovascular:      Rate and Rhythm: Normal rate.   Pulmonary:      Effort: Pulmonary effort is normal.   Abdominal:      General: There is distension.      Palpations: Abdomen is soft.      Tenderness: There is abdominal tenderness. There is no guarding or rebound.   Neurological:      Mental Status: She is alert and oriented to person, place, and time.   Psychiatric:         Mood and Affect: Mood normal.         Behavior: Behavior normal.         Thought Content: Thought content normal.         Judgment: Judgment normal.              Recent Results (from the past 24 hour(s))   CBC with Auto Differential    Collection Time: 04/21/24  3:48 AM   Result Value Ref Range    WBC 10.8 3.6 - 11.0 K/uL    RBC 5.14 3.80 - 5.20 M/uL    Hemoglobin 13.7 11.5 - 16.0 g/dL    Hematocrit 41.7 35.0 - 47.0 %    MCV 81.1 80.0 - 99.0 FL    MCH 26.7 26.0 - 34.0 PG    MCHC 32.9 30.0 - 36.5 g/dL    RDW 14.6 (H) 11.5 - 14.5 %    Platelets 380 150 - 400 K/uL    MPV 9.5 8.9 - 12.9 FL    Nucleated RBCs 0.0 0.0  WBC    nRBC 0.00 0.00 - 0.01 K/uL    Neutrophils % 85 (H) 32 - 75 %    Lymphocytes % 12 12 - 49 %    Monocytes % 3 (L) 5 - 13 %    Eosinophils % 0 0 - 7 %    Basophils % 0 0 - 1 %    Immature Granulocytes % 0 0 - 0.5 %    Neutrophils Absolute 9.1 (H) 1.8 - 8.0 K/UL    Lymphocytes Absolute 1.3 0.8 - 3.5 K/UL    Monocytes Absolute 0.3 0.0 - 1.0 K/UL    Eosinophils Absolute 0.0 0.0 - 0.4 K/UL    Basophils Absolute 0.0 0.0 - 0.1 K/UL    Immature Granulocytes Absolute 0.0 0.00 - 0.04 K/UL    Differential Type AUTOMATED      Comprehensive Metabolic Panel    Collection Time: 04/21/24  3:48 AM   Result Value Ref Range    Sodium 133 (L) 136 - 145 mmol/L    Potassium Hemolyzed, Recollection Recommended 3.5 - 5.1 mmol/L    Chloride 104 97 - 108 mmol/L    CO2 24 21 - 32 mmol/L    Anion Gap 5 5 - 15 mmol/L    Glucose 130 (H) 65 - 100 mg/dL    BUN 9 6 - 20 mg/dL    Creatinine 0.71 0.55 - 1.02 mg/dL    Bun/Cre Ratio 13 12 - 20      Est, Glom Filt Rate >90 >60 ml/min/1.73m2    Calcium 10.0 8.5 - 10.1 mg/dL    Total Bilirubin 1.4 (H) 0.2 - 1.0 mg/dL    AST Hemolyzed, Recollection Recommended 15 - 37 U/L    ALT 27 12 - 78 U/L    Alk Phosphatase 78 45 - 117 U/L    Total Protein 8.0 6.4 - 8.2 g/dL    Albumin 4.2 3.5 - 5.0 g/dL    Globulin 3.8 2.0 - 4.0 g/dL    Albumin/Globulin Ratio 1.1 1.1 - 2.2     Lipase    Collection Time: 04/21/24  3:48 AM   Result Value Ref Range    Lipase 18 13 - 75 U/L   Magnesium    Collection Time: 04/21/24  3:48 AM   Result Value Ref Range    Magnesium Hemolyzed, Recollection Recommended 1.6 - 2.4 mg/dL   Troponin    Collection Time: 04/21/24  3:48 AM   Result Value Ref Range    Troponin, High Sensitivity <4 0 - 51 ng/L   COVID-19, Rapid    Collection Time: 04/21/24  4:01 AM    Specimen: Nasopharyngeal   Result Value Ref Range    SARS-CoV-2, Rapid Not Detected Not Detected     Rapid influenza A/B antigens    Collection Time: 04/21/24  4:01 AM    Specimen: Nasal Washing   Result Value Ref Range    Influenza A Ag Negative Negative      Influenza B Ag Negative Negative     Urinalysis    Collection Time: 04/21/24  5:15 AM   Result Value Ref Range    Color, UA Yellow/Straw      Appearance Clear Clear      Specific Gravity, UA 1.019 1.003 - 1.030      pH, Urine 7.0 5.0 - 8.0      Protein, UA Negative Negative mg/dL    Glucose, UA Negative Negative mg/dL    Ketones, Urine Negative Negative mg/dL    Bilirubin Urine Negative Negative      Blood, Urine Negative Negative      Urobilinogen, Urine 0.1 0.1 - 1.0 EU/dL    Nitrite,

## 2024-04-23 LAB
ANION GAP SERPL CALC-SCNC: 9 MMOL/L (ref 5–15)
BASOPHILS # BLD: 0 K/UL (ref 0–0.1)
BASOPHILS NFR BLD: 0 % (ref 0–1)
BUN SERPL-MCNC: 13 MG/DL (ref 6–20)
BUN/CREAT SERPL: 22 (ref 12–20)
CA-I BLD-MCNC: 9 MG/DL (ref 8.5–10.1)
CHLORIDE SERPL-SCNC: 113 MMOL/L (ref 97–108)
CO2 SERPL-SCNC: 25 MMOL/L (ref 21–32)
CREAT SERPL-MCNC: 0.6 MG/DL (ref 0.55–1.02)
DIFFERENTIAL METHOD BLD: ABNORMAL
EOSINOPHIL # BLD: 0.1 K/UL (ref 0–0.4)
EOSINOPHIL NFR BLD: 1 % (ref 0–7)
ERYTHROCYTE [DISTWIDTH] IN BLOOD BY AUTOMATED COUNT: 15.3 % (ref 11.5–14.5)
GLUCOSE SERPL-MCNC: 70 MG/DL (ref 65–100)
HCT VFR BLD AUTO: 35.3 % (ref 35–47)
HGB BLD-MCNC: 11.5 G/DL (ref 11.5–16)
IMM GRANULOCYTES # BLD AUTO: 0 K/UL (ref 0–0.04)
IMM GRANULOCYTES NFR BLD AUTO: 0 % (ref 0–0.5)
LYMPHOCYTES # BLD: 1.2 K/UL (ref 0.8–3.5)
LYMPHOCYTES NFR BLD: 18 % (ref 12–49)
MCH RBC QN AUTO: 27.4 PG (ref 26–34)
MCHC RBC AUTO-ENTMCNC: 32.6 G/DL (ref 30–36.5)
MCV RBC AUTO: 84.2 FL (ref 80–99)
MONOCYTES # BLD: 0.9 K/UL (ref 0–1)
MONOCYTES NFR BLD: 14 % (ref 5–13)
NEUTS SEG # BLD: 4.5 K/UL (ref 1.8–8)
NEUTS SEG NFR BLD: 67 % (ref 32–75)
NRBC # BLD: 0 K/UL (ref 0–0.01)
NRBC BLD-RTO: 0 PER 100 WBC
PLATELET # BLD AUTO: 295 K/UL (ref 150–400)
PMV BLD AUTO: 9.8 FL (ref 8.9–12.9)
POTASSIUM SERPL-SCNC: 3.5 MMOL/L (ref 3.5–5.1)
RBC # BLD AUTO: 4.19 M/UL (ref 3.8–5.2)
SODIUM SERPL-SCNC: 147 MMOL/L (ref 136–145)
WBC # BLD AUTO: 6.7 K/UL (ref 3.6–11)

## 2024-04-23 PROCEDURE — 85025 COMPLETE CBC W/AUTO DIFF WBC: CPT

## 2024-04-23 PROCEDURE — 99231 SBSQ HOSP IP/OBS SF/LOW 25: CPT | Performed by: SURGERY

## 2024-04-23 PROCEDURE — 80048 BASIC METABOLIC PNL TOTAL CA: CPT

## 2024-04-23 PROCEDURE — 2580000003 HC RX 258: Performed by: STUDENT IN AN ORGANIZED HEALTH CARE EDUCATION/TRAINING PROGRAM

## 2024-04-23 PROCEDURE — 6360000002 HC RX W HCPCS: Performed by: INTERNAL MEDICINE

## 2024-04-23 PROCEDURE — 2580000003 HC RX 258: Performed by: INTERNAL MEDICINE

## 2024-04-23 PROCEDURE — 1100000000 HC RM PRIVATE

## 2024-04-23 PROCEDURE — 36415 COLL VENOUS BLD VENIPUNCTURE: CPT

## 2024-04-23 PROCEDURE — 6360000002 HC RX W HCPCS: Performed by: SURGERY

## 2024-04-23 RX ORDER — SODIUM CHLORIDE 450 MG/100ML
INJECTION, SOLUTION INTRAVENOUS CONTINUOUS
Status: DISCONTINUED | OUTPATIENT
Start: 2024-04-23 | End: 2024-04-24

## 2024-04-23 RX ORDER — MORPHINE SULFATE 4 MG/ML
4 INJECTION, SOLUTION INTRAMUSCULAR; INTRAVENOUS EVERY 4 HOURS PRN
Status: DISPENSED | OUTPATIENT
Start: 2024-04-23 | End: 2024-04-25

## 2024-04-23 RX ORDER — MORPHINE SULFATE 2 MG/ML
2 INJECTION, SOLUTION INTRAMUSCULAR; INTRAVENOUS EVERY 4 HOURS PRN
Status: ACTIVE | OUTPATIENT
Start: 2024-04-23 | End: 2024-04-25

## 2024-04-23 RX ADMIN — SODIUM CHLORIDE, PRESERVATIVE FREE 10 ML: 5 INJECTION INTRAVENOUS at 08:03

## 2024-04-23 RX ADMIN — SODIUM CHLORIDE, PRESERVATIVE FREE 10 ML: 5 INJECTION INTRAVENOUS at 21:45

## 2024-04-23 RX ADMIN — KETOROLAC TROMETHAMINE 15 MG: 15 INJECTION, SOLUTION INTRAMUSCULAR; INTRAVENOUS at 06:22

## 2024-04-23 RX ADMIN — KETOROLAC TROMETHAMINE 15 MG: 15 INJECTION, SOLUTION INTRAMUSCULAR; INTRAVENOUS at 13:26

## 2024-04-23 RX ADMIN — MORPHINE SULFATE 4 MG: 4 INJECTION INTRAVENOUS at 08:02

## 2024-04-23 RX ADMIN — KETOROLAC TROMETHAMINE 15 MG: 15 INJECTION, SOLUTION INTRAMUSCULAR; INTRAVENOUS at 00:48

## 2024-04-23 RX ADMIN — SODIUM CHLORIDE: 4.5 INJECTION, SOLUTION INTRAVENOUS at 16:46

## 2024-04-23 RX ADMIN — KETOROLAC TROMETHAMINE 15 MG: 15 INJECTION, SOLUTION INTRAMUSCULAR; INTRAVENOUS at 17:04

## 2024-04-23 ASSESSMENT — PAIN DESCRIPTION - LOCATION
LOCATION: ABDOMEN

## 2024-04-23 ASSESSMENT — ENCOUNTER SYMPTOMS
EYES NEGATIVE: 1
COUGH: 0
VOMITING: 0
DIARRHEA: 0
RHINORRHEA: 0
BACK PAIN: 0
ALLERGIC/IMMUNOLOGIC NEGATIVE: 1
ABDOMINAL PAIN: 1
SORE THROAT: 0
SHORTNESS OF BREATH: 0
WHEEZING: 0
TROUBLE SWALLOWING: 0

## 2024-04-23 ASSESSMENT — PAIN DESCRIPTION - ORIENTATION
ORIENTATION: UPPER;LOWER
ORIENTATION: UPPER;LOWER

## 2024-04-23 ASSESSMENT — PAIN - FUNCTIONAL ASSESSMENT
PAIN_FUNCTIONAL_ASSESSMENT: ACTIVITIES ARE NOT PREVENTED
PAIN_FUNCTIONAL_ASSESSMENT: ACTIVITIES ARE NOT PREVENTED

## 2024-04-23 ASSESSMENT — PAIN DESCRIPTION - DESCRIPTORS
DESCRIPTORS: ACHING
DESCRIPTORS: ACHING

## 2024-04-23 ASSESSMENT — PAIN SCALES - GENERAL
PAINLEVEL_OUTOF10: 6
PAINLEVEL_OUTOF10: 3
PAINLEVEL_OUTOF10: 4
PAINLEVEL_OUTOF10: 6
PAINLEVEL_OUTOF10: 5
PAINLEVEL_OUTOF10: 5

## 2024-04-23 NOTE — PLAN OF CARE
Problem: Discharge Planning  Goal: Discharge to home or other facility with appropriate resources  4/23/2024 1936 by Umair Westbrook RN  Outcome: Progressing  4/23/2024 0753 by Linh Avila RN  Outcome: Progressing  Flowsheets (Taken 4/22/2024 1946)  Discharge to home or other facility with appropriate resources: Identify barriers to discharge with patient and caregiver     Problem: Pain  Goal: Verbalizes/displays adequate comfort level or baseline comfort level  4/23/2024 1936 by Umair Westbrook RN  Outcome: Progressing  4/23/2024 0753 by Linh Avila RN  Outcome: Progressing     Problem: ABCDS Injury Assessment  Goal: Absence of physical injury  Outcome: Progressing

## 2024-04-23 NOTE — PLAN OF CARE
Problem: Discharge Planning  Goal: Discharge to home or other facility with appropriate resources  Outcome: Progressing  Flowsheets (Taken 4/22/2024 1946)  Discharge to home or other facility with appropriate resources: Identify barriers to discharge with patient and caregiver     Problem: Pain  Goal: Verbalizes/displays adequate comfort level or baseline comfort level  Outcome: Progressing

## 2024-04-24 ENCOUNTER — APPOINTMENT (OUTPATIENT)
Facility: HOSPITAL | Age: 59
End: 2024-04-24
Payer: COMMERCIAL

## 2024-04-24 LAB
ANION GAP SERPL CALC-SCNC: 2 MMOL/L (ref 5–15)
BASOPHILS # BLD: 0 K/UL (ref 0–0.1)
BASOPHILS NFR BLD: 1 % (ref 0–1)
BUN SERPL-MCNC: 7 MG/DL (ref 6–20)
BUN/CREAT SERPL: 11 (ref 12–20)
CA-I BLD-MCNC: 8.6 MG/DL (ref 8.5–10.1)
CHLORIDE SERPL-SCNC: 108 MMOL/L (ref 97–108)
CO2 SERPL-SCNC: 27 MMOL/L (ref 21–32)
CREAT SERPL-MCNC: 0.65 MG/DL (ref 0.55–1.02)
DIFFERENTIAL METHOD BLD: ABNORMAL
EOSINOPHIL # BLD: 0.1 K/UL (ref 0–0.4)
EOSINOPHIL NFR BLD: 2 % (ref 0–7)
ERYTHROCYTE [DISTWIDTH] IN BLOOD BY AUTOMATED COUNT: 14.8 % (ref 11.5–14.5)
GLUCOSE SERPL-MCNC: 87 MG/DL (ref 65–100)
HCT VFR BLD AUTO: 33.8 % (ref 35–47)
HGB BLD-MCNC: 10.8 G/DL (ref 11.5–16)
IMM GRANULOCYTES # BLD AUTO: 0 K/UL (ref 0–0.04)
IMM GRANULOCYTES NFR BLD AUTO: 0 % (ref 0–0.5)
LYMPHOCYTES # BLD: 2.3 K/UL (ref 0.8–3.5)
LYMPHOCYTES NFR BLD: 47 % (ref 12–49)
MCH RBC QN AUTO: 26.6 PG (ref 26–34)
MCHC RBC AUTO-ENTMCNC: 32 G/DL (ref 30–36.5)
MCV RBC AUTO: 83.3 FL (ref 80–99)
MONOCYTES # BLD: 0.9 K/UL (ref 0–1)
MONOCYTES NFR BLD: 18 % (ref 5–13)
NEUTS SEG # BLD: 1.6 K/UL (ref 1.8–8)
NEUTS SEG NFR BLD: 32 % (ref 32–75)
NRBC # BLD: 0 K/UL (ref 0–0.01)
NRBC BLD-RTO: 0 PER 100 WBC
PLATELET # BLD AUTO: 279 K/UL (ref 150–400)
PMV BLD AUTO: 9.7 FL (ref 8.9–12.9)
POTASSIUM SERPL-SCNC: 3.3 MMOL/L (ref 3.5–5.1)
RBC # BLD AUTO: 4.06 M/UL (ref 3.8–5.2)
SODIUM SERPL-SCNC: 137 MMOL/L (ref 136–145)
WBC # BLD AUTO: 4.9 K/UL (ref 3.6–11)

## 2024-04-24 PROCEDURE — 74177 CT ABD & PELVIS W/CONTRAST: CPT

## 2024-04-24 PROCEDURE — 99231 SBSQ HOSP IP/OBS SF/LOW 25: CPT | Performed by: SURGERY

## 2024-04-24 PROCEDURE — 2580000003 HC RX 258: Performed by: STUDENT IN AN ORGANIZED HEALTH CARE EDUCATION/TRAINING PROGRAM

## 2024-04-24 PROCEDURE — 36415 COLL VENOUS BLD VENIPUNCTURE: CPT

## 2024-04-24 PROCEDURE — 6370000000 HC RX 637 (ALT 250 FOR IP): Performed by: STUDENT IN AN ORGANIZED HEALTH CARE EDUCATION/TRAINING PROGRAM

## 2024-04-24 PROCEDURE — 85025 COMPLETE CBC W/AUTO DIFF WBC: CPT

## 2024-04-24 PROCEDURE — 80048 BASIC METABOLIC PNL TOTAL CA: CPT

## 2024-04-24 PROCEDURE — 6360000004 HC RX CONTRAST MEDICATION: Performed by: SURGERY

## 2024-04-24 PROCEDURE — 1100000000 HC RM PRIVATE

## 2024-04-24 PROCEDURE — 2580000003 HC RX 258: Performed by: INTERNAL MEDICINE

## 2024-04-24 PROCEDURE — 6360000002 HC RX W HCPCS: Performed by: SURGERY

## 2024-04-24 RX ORDER — AMLODIPINE BESYLATE 5 MG/1
5 TABLET ORAL DAILY
Status: DISCONTINUED | OUTPATIENT
Start: 2024-04-24 | End: 2024-04-25 | Stop reason: HOSPADM

## 2024-04-24 RX ORDER — POTASSIUM CHLORIDE 20 MEQ/1
40 TABLET, EXTENDED RELEASE ORAL ONCE
Status: COMPLETED | OUTPATIENT
Start: 2024-04-24 | End: 2024-04-24

## 2024-04-24 RX ORDER — MAGNESIUM HYDROXIDE/ALUMINUM HYDROXICE/SIMETHICONE 120; 1200; 1200 MG/30ML; MG/30ML; MG/30ML
30 SUSPENSION ORAL EVERY 6 HOURS PRN
Status: DISCONTINUED | OUTPATIENT
Start: 2024-04-24 | End: 2024-04-25 | Stop reason: HOSPADM

## 2024-04-24 RX ADMIN — MORPHINE SULFATE 4 MG: 4 INJECTION INTRAVENOUS at 09:16

## 2024-04-24 RX ADMIN — SODIUM CHLORIDE, PRESERVATIVE FREE 10 ML: 5 INJECTION INTRAVENOUS at 22:37

## 2024-04-24 RX ADMIN — SODIUM CHLORIDE, PRESERVATIVE FREE 10 ML: 5 INJECTION INTRAVENOUS at 09:18

## 2024-04-24 RX ADMIN — SODIUM CHLORIDE: 4.5 INJECTION, SOLUTION INTRAVENOUS at 04:27

## 2024-04-24 RX ADMIN — IOPAMIDOL 100 ML: 755 INJECTION, SOLUTION INTRAVENOUS at 21:33

## 2024-04-24 RX ADMIN — MORPHINE SULFATE 4 MG: 4 INJECTION INTRAVENOUS at 17:00

## 2024-04-24 RX ADMIN — DIATRIZOATE MEGLUMINE AND DIATRIZOATE SODIUM 30 ML: 660; 100 LIQUID ORAL; RECTAL at 18:47

## 2024-04-24 RX ADMIN — POTASSIUM CHLORIDE 40 MEQ: 1500 TABLET, EXTENDED RELEASE ORAL at 12:00

## 2024-04-24 RX ADMIN — AMLODIPINE BESYLATE 5 MG: 5 TABLET ORAL at 11:59

## 2024-04-24 RX ADMIN — ALUMINUM HYDROXIDE, MAGNESIUM HYDROXIDE, AND SIMETHICONE 30 ML: 200; 200; 20 SUSPENSION ORAL at 13:37

## 2024-04-24 ASSESSMENT — ENCOUNTER SYMPTOMS
TROUBLE SWALLOWING: 0
COUGH: 0
SHORTNESS OF BREATH: 0
ABDOMINAL PAIN: 1
DIARRHEA: 0
SORE THROAT: 0
VOMITING: 0
EYES NEGATIVE: 1
RHINORRHEA: 0
WHEEZING: 0
ALLERGIC/IMMUNOLOGIC NEGATIVE: 1
BACK PAIN: 0
NAUSEA: 0

## 2024-04-24 ASSESSMENT — PAIN DESCRIPTION - DESCRIPTORS
DESCRIPTORS: ACHING
DESCRIPTORS: ACHING

## 2024-04-24 ASSESSMENT — PAIN DESCRIPTION - ORIENTATION
ORIENTATION: MID
ORIENTATION: MID

## 2024-04-24 ASSESSMENT — PAIN DESCRIPTION - LOCATION
LOCATION: ABDOMEN
LOCATION: ABDOMEN

## 2024-04-24 ASSESSMENT — PAIN SCALES - GENERAL
PAINLEVEL_OUTOF10: 3
PAINLEVEL_OUTOF10: 8
PAINLEVEL_OUTOF10: 7

## 2024-04-24 NOTE — PROGRESS NOTES
of visit spent in counseling and coordination of care     ________________________________________________________________________  Bakari Busby PA-C     Procedures: see electronic medical records for all procedures/Xrays and details which were not copied into this note but were reviewed prior to creation of Plan.      LABS:  I reviewed today's most current labs and imaging studies.  Pertinent labs include:  Recent Labs     04/22/24  0638 04/23/24  0706 04/24/24  0708   WBC 6.6 6.7 4.9   HGB 11.7 11.5 10.8*   HCT 35.4 35.3 33.8*    295 279       Recent Labs     04/22/24  0638 04/23/24  0706 04/24/24  0708    147* 137   K 3.5 3.5 3.3*   * 113* 108   CO2 28 25 27   BUN 10 13 7         Signed: Bakari Busby PA-C

## 2024-04-24 NOTE — PLAN OF CARE
Problem: Discharge Planning  Goal: Discharge to home or other facility with appropriate resources  Flowsheets  Taken 4/24/2024 1218  Discharge to home or other facility with appropriate resources:   Identify barriers to discharge with patient and caregiver   Arrange for needed discharge resources and transportation as appropriate   Identify discharge learning needs (meds, wound care, etc)  Taken 4/24/2024 1216  Discharge to home or other facility with appropriate resources: Identify barriers to discharge with patient and caregiver

## 2024-04-24 NOTE — CARE COORDINATION
CM reviewed chart. Per IDR patient has another 24 hours before they may be medically stable to dc.     Current DCP is home/self.    CM will continue to follow.

## 2024-04-24 NOTE — PROGRESS NOTES
4 Eyes Skin Assessment     NAME:  Arminda Can  YOB: 1965  MEDICAL RECORD NUMBER:  [id number]    The patient is being assessed for  Other weekly assessment    I agree that at least one RN has performed a thorough Head to Toe Skin Assessment on the patient. ALL assessment sites listed below have been assessed.      Areas assessed by both nurses:    Head, Face, Ears, Shoulders, Back, Chest, Arms, Elbows, Hands, Sacrum. Buttock, Coccyx, Ischium, Legs. Feet and Heels, and Under Medical Devices         Does the Patient have a Wound? No noted wound(s)       Maycol Prevention initiated by RN: No  Wound Care Orders initiated by RN: No    Pressure Injury (Stage 3,4, Unstageable, DTI, NWPT, and Complex wounds) if present, place Wound referral order by RN under : No    New Ostomies, if present place, Ostomy referral order under : No     Nurse 1 eSignature: Electronically signed by Nena Harrington RN on 4/24/24 at 5:12 AM EDT    **SHARE this note so that the co-signing nurse can place an eSignature**    Nurse 2 eSignature: Electronically signed by Tee Martini RN on 4/24/24 at 5:18 AM EDT

## 2024-04-24 NOTE — PROGRESS NOTES
General Surgery Progress Note      Subjective    Says abdominal pain significantly improved and rare. Denies nausea or vomiting. Having bowel movements. Denies chest pain or shortness of breath    Objective    Patient Vitals for the past 24 hrs:   Temp Pulse Resp BP SpO2   04/23/24 1937 98.4 °F (36.9 °C) 80 18 (!) 141/94 98 %   04/23/24 1935 98.4 °F (36.9 °C) 73 18 (!) 144/95 100 %   04/23/24 1504 98.1 °F (36.7 °C) 83 16 126/79 100 %   04/23/24 0807 97.3 °F (36.3 °C) 81 16 122/78 97 %   04/23/24 0323 98.2 °F (36.8 °C) 81 18 114/78 98 %   04/22/24 2235 -- -- 18 -- --   04/22/24 2205 -- -- 17 -- --       NGT = 225 ml in the last 24 hours    PE  GEN - Awake, alert, communicating appropriately.  NAD  Pulm - normal WOB  CV - normal rate  Abd - soft non tender non distended       Labs  Recent Labs     04/23/24  0706 04/22/24  0638 04/21/24  0348   WBC 6.7 6.6 10.8   HGB 11.5 11.7 13.7   HCT 35.3 35.4 41.7   MCV 84.2 83.1 81.1    289 380      Lab Results   Component Value Date/Time     04/23/2024 07:06 AM    K 3.5 04/23/2024 07:06 AM     04/23/2024 07:06 AM    CO2 25 04/23/2024 07:06 AM    BUN 13 04/23/2024 07:06 AM    CREATININE 0.60 04/23/2024 07:06 AM    GLUCOSE 70 04/23/2024 07:06 AM    CALCIUM 9.0 04/23/2024 07:06 AM    LABGLOM >90 04/23/2024 07:06 AM       Xray Result (most recent):  XR ABDOMEN (KUB) (SINGLE AP VIEW) 04/22/2024    Narrative  EXAM:  XR ABDOMEN (KUB) (SINGLE AP VIEW)    INDICATION: Gastrografin given through NG tube.    COMPARISON: None.    TECHNIQUE: Supine frontal abdomen (KUB).    FINDINGS: Gastrografin is noted throughout the small bowel and colon no dilated  small bowel. Stool and gas are present in the colon. No pathologic  calcification. Osseous structures are age appropriate.    Impression  No evidence of high-grade bowel obstruction. Gastrografin is noted  throughout the small

## 2024-04-25 VITALS
SYSTOLIC BLOOD PRESSURE: 129 MMHG | OXYGEN SATURATION: 100 % | HEART RATE: 88 BPM | HEIGHT: 65 IN | DIASTOLIC BLOOD PRESSURE: 78 MMHG | WEIGHT: 180 LBS | RESPIRATION RATE: 14 BRPM | TEMPERATURE: 97.9 F | BODY MASS INDEX: 29.99 KG/M2

## 2024-04-25 PROBLEM — E87.6 HYPOKALEMIA: Status: ACTIVE | Noted: 2024-04-25

## 2024-04-25 PROBLEM — I10 HYPERTENSION: Status: ACTIVE | Noted: 2024-04-25

## 2024-04-25 LAB
ANION GAP SERPL CALC-SCNC: 5 MMOL/L (ref 5–15)
BASOPHILS # BLD: 0 K/UL (ref 0–0.1)
BASOPHILS NFR BLD: 0 % (ref 0–1)
BUN SERPL-MCNC: 5 MG/DL (ref 6–20)
BUN/CREAT SERPL: 6 (ref 12–20)
CA-I BLD-MCNC: 9.3 MG/DL (ref 8.5–10.1)
CHLORIDE SERPL-SCNC: 105 MMOL/L (ref 97–108)
CO2 SERPL-SCNC: 27 MMOL/L (ref 21–32)
CREAT SERPL-MCNC: 0.77 MG/DL (ref 0.55–1.02)
DIFFERENTIAL METHOD BLD: ABNORMAL
EOSINOPHIL # BLD: 0.1 K/UL (ref 0–0.4)
EOSINOPHIL NFR BLD: 2 % (ref 0–7)
ERYTHROCYTE [DISTWIDTH] IN BLOOD BY AUTOMATED COUNT: 14.6 % (ref 11.5–14.5)
GLUCOSE SERPL-MCNC: 100 MG/DL (ref 65–100)
HCT VFR BLD AUTO: 35.7 % (ref 35–47)
HGB BLD-MCNC: 11.9 G/DL (ref 11.5–16)
IMM GRANULOCYTES # BLD AUTO: 0 K/UL (ref 0–0.04)
IMM GRANULOCYTES NFR BLD AUTO: 0 % (ref 0–0.5)
LYMPHOCYTES # BLD: 1.9 K/UL (ref 0.8–3.5)
LYMPHOCYTES NFR BLD: 39 % (ref 12–49)
MAGNESIUM SERPL-MCNC: 2 MG/DL (ref 1.6–2.4)
MCH RBC QN AUTO: 27.6 PG (ref 26–34)
MCHC RBC AUTO-ENTMCNC: 33.3 G/DL (ref 30–36.5)
MCV RBC AUTO: 82.8 FL (ref 80–99)
MONOCYTES # BLD: 0.8 K/UL (ref 0–1)
MONOCYTES NFR BLD: 16 % (ref 5–13)
NEUTS SEG # BLD: 2.1 K/UL (ref 1.8–8)
NEUTS SEG NFR BLD: 43 % (ref 32–75)
NRBC # BLD: 0 K/UL (ref 0–0.01)
NRBC BLD-RTO: 0 PER 100 WBC
PHOSPHATE SERPL-MCNC: 2.5 MG/DL (ref 2.6–4.7)
PLATELET # BLD AUTO: 317 K/UL (ref 150–400)
PMV BLD AUTO: 9.5 FL (ref 8.9–12.9)
POTASSIUM SERPL-SCNC: 3.2 MMOL/L (ref 3.5–5.1)
RBC # BLD AUTO: 4.31 M/UL (ref 3.8–5.2)
SODIUM SERPL-SCNC: 137 MMOL/L (ref 136–145)
WBC # BLD AUTO: 4.9 K/UL (ref 3.6–11)

## 2024-04-25 PROCEDURE — 36415 COLL VENOUS BLD VENIPUNCTURE: CPT

## 2024-04-25 PROCEDURE — 85025 COMPLETE CBC W/AUTO DIFF WBC: CPT

## 2024-04-25 PROCEDURE — 80048 BASIC METABOLIC PNL TOTAL CA: CPT

## 2024-04-25 PROCEDURE — 84100 ASSAY OF PHOSPHORUS: CPT

## 2024-04-25 PROCEDURE — 83735 ASSAY OF MAGNESIUM: CPT

## 2024-04-25 PROCEDURE — 6370000000 HC RX 637 (ALT 250 FOR IP): Performed by: INTERNAL MEDICINE

## 2024-04-25 PROCEDURE — 2580000003 HC RX 258: Performed by: INTERNAL MEDICINE

## 2024-04-25 PROCEDURE — 6370000000 HC RX 637 (ALT 250 FOR IP): Performed by: STUDENT IN AN ORGANIZED HEALTH CARE EDUCATION/TRAINING PROGRAM

## 2024-04-25 RX ORDER — POTASSIUM CHLORIDE 20 MEQ/1
40 TABLET, EXTENDED RELEASE ORAL ONCE
Status: DISCONTINUED | OUTPATIENT
Start: 2024-04-25 | End: 2024-04-25 | Stop reason: HOSPADM

## 2024-04-25 RX ORDER — ONDANSETRON 4 MG/1
4 TABLET, ORALLY DISINTEGRATING ORAL EVERY 8 HOURS PRN
Qty: 30 TABLET | Refills: 0 | Status: SHIPPED | OUTPATIENT
Start: 2024-04-25

## 2024-04-25 RX ORDER — AMLODIPINE BESYLATE 5 MG/1
5 TABLET ORAL DAILY
Qty: 30 TABLET | Refills: 3 | Status: SHIPPED | OUTPATIENT
Start: 2024-04-26

## 2024-04-25 RX ORDER — TRAMADOL HYDROCHLORIDE 50 MG/1
50 TABLET ORAL EVERY 6 HOURS PRN
Qty: 12 TABLET | Refills: 0 | Status: SHIPPED | OUTPATIENT
Start: 2024-04-25 | End: 2024-04-28

## 2024-04-25 RX ADMIN — AMLODIPINE BESYLATE 5 MG: 5 TABLET ORAL at 10:36

## 2024-04-25 RX ADMIN — POTASSIUM CHLORIDE 40 MEQ: 1500 TABLET, EXTENDED RELEASE ORAL at 10:35

## 2024-04-25 RX ADMIN — SODIUM CHLORIDE, PRESERVATIVE FREE 10 ML: 5 INJECTION INTRAVENOUS at 10:36

## 2024-04-25 ASSESSMENT — PAIN SCALES - GENERAL: PAINLEVEL_OUTOF10: 2

## 2024-04-25 NOTE — PROGRESS NOTES
Patient medically cleared for discharge. Tolerating PO intake without nausea or pain.  PIV removed. Patient transported to [facility] via wheelchair to be discharged home with all belongings and discharge paperwork. Discharge paperwork reviewed including new and changed medications and pharmacy.

## 2024-04-25 NOTE — PROGRESS NOTES
General Surgery Progress Note      Subjective    Some crampy pain with breakfast. No pain with lunch. Denies abdominal pain at other times. Denies nausea or vomiting. Having Bms and passing flatus    Objective    Patient Vitals for the past 24 hrs:   Temp Pulse Resp BP SpO2   04/25/24 0847 98.6 °F (37 °C) 69 20 138/77 100 %   04/25/24 0159 98.2 °F (36.8 °C) 73 18 (!) 140/84 98 %   04/24/24 2045 98.8 °F (37.1 °C) 74 18 127/75 98 %   04/24/24 1700 -- -- 16 -- --   04/24/24 1504 98.1 °F (36.7 °C) 72 18 (!) 142/90 99 %       PE  GEN - Awake, alert, communicating appropriately.  NAD  Pulm - normal WOB  CV - normal rate  Abd - soft non tender non distended      Recent Labs     04/24/24  0708 04/23/24  0706 04/22/24  0638   WBC 4.9 6.7 6.6   HGB 10.8* 11.5 11.7   HCT 33.8* 35.3 35.4   MCV 83.3 84.2 83.1    295 289      Lab Results   Component Value Date/Time     04/24/2024 07:08 AM    K 3.3 04/24/2024 07:08 AM     04/24/2024 07:08 AM    CO2 27 04/24/2024 07:08 AM    BUN 7 04/24/2024 07:08 AM    CREATININE 0.65 04/24/2024 07:08 AM    GLUCOSE 87 04/24/2024 07:08 AM    CALCIUM 8.6 04/24/2024 07:08 AM    LABGLOM >90 04/24/2024 07:08 AM         Assessment    SBO  2.  3.     Plan    If patient continues has pain with dinner then will repeat CT abdomen/pelvis  Continue regular diet  Serial abdominal exams  Will follow with you    25 mins of time was spent with the patient including reviewing chart, history and physical examination, reviewing labs and imaging and discussing treatment plan with patient and their family.      Lauren Messer MD

## 2024-04-25 NOTE — DISCHARGE SUMMARY
Discharge Summary    Name: rAminda Can  [id number]  YOB: 1965 (Age: 58 y.o.)   Date of Admission: 4/21/2024  Date of Discharge: 4/25/2024  Attending Physician: Vitor Thomas MD    Discharge Diagnosis:   Principal Problem:    SBO (small bowel obstruction) (Formerly [facility])  Active Problems:    Hypokalemia    Hypertension  Resolved Problems:    * No resolved hospital problems. *       Consultations:  IP CONSULT TO GENERAL SURGERY      Brief Admission History/Reason for Admission Per Akil Canas Cha, MD:   Small bowel obstruction    Brief Hospital Course by Main Problems:   Patient is a 58-year-old female with a past medical history of hypertension and hysterectomy who was admitted on 4 21-24 with small bowel obstruction.  CT of the abdomen pelvis with IV contrast revealed small bowel structure with thickening of a loop of small bowel in the right lower quadrant serving as the distal ileum and transition point.  Small bowel ascites.  General surgery consulted and NG tube was placed.  CXR after NG tube placement revealed NG tube tip extending to the stomach.  Lungs are clear.  General surgery evaluate patient and recommended n.p.o. with IV hydration.  Continue NG tube decompression with serial abdominal exams.  Repeat KUB with Gastrografin complete revealed no evidence of high-grade small bowel obstruction.  Gastrografin was noted throughout the small bowel and colon.  General surgery discontinued tube on 4/23/2024 with initiation of clear liquid diet.  Patient has been able tolerate advancement of diet without any difficulty or recurrence of abdominal pain, nausea or vomiting.  General surgery cleared patient for discharge if able to tolerate dinner tonight.  Advised patient if recurring abdominal symptoms continue follow-up outpatient with GI for possible colonoscopy.  Also, recommend follow-up outpatient with PCP in 10 to 14 days with recent admission for continued monitoring  APRN - NP  [address]  [address]  [facility] [address] [phone]    Follow up  Follow-up in 10 to 14 days with PCP with recent admission for continued monitoring and management of medications and other comorbidities    Justin Cowan MD  [address] [place] [id number] [phone]    Follow up  Follow-up outpatient as needed          Total time in minutes spent coordinating this discharge (includes going over instructions, follow-up, prescriptions, and preparing report for sign off to her PCP) :  35 minutes

## 2024-04-25 NOTE — PLAN OF CARE
Problem: Discharge Planning  Goal: Discharge to home or other facility with appropriate resources  4/25/2024 0057 by Ted Gaspar, RN  Outcome: Progressing  4/24/2024 1218 by Leeann Crawford, MEGAN  Flowsheets  Taken 4/24/2024 1218  Discharge to home or other facility with appropriate resources:   Identify barriers to discharge with patient and caregiver   Arrange for needed discharge resources and transportation as appropriate   Identify discharge learning needs (meds, wound care, etc)  Taken 4/24/2024 1216  Discharge to home or other facility with appropriate resources: Identify barriers to discharge with patient and caregiver     Problem: Pain  Goal: Verbalizes/displays adequate comfort level or baseline comfort level  Outcome: Progressing     Problem: ABCDS Injury Assessment  Goal: Absence of physical injury  Outcome: Progressing     Problem: Safety - Adult  Goal: Free from fall injury  Outcome: Progressing

## 2024-04-25 NOTE — PLAN OF CARE
Problem: Discharge Planning  Goal: Discharge to home or other facility with appropriate resources  4/25/2024 1347 by Mela Mcadams RN  Outcome: Progressing  4/25/2024 0057 by Ted Gaspar RN  Outcome: Progressing     Problem: Pain  Goal: Verbalizes/displays adequate comfort level or baseline comfort level  4/25/2024 1347 by Mela Mcadams RN  Outcome: Progressing  4/25/2024 0057 by Ted Gaspar RN  Outcome: Progressing     Problem: ABCDS Injury Assessment  Goal: Absence of physical injury  4/25/2024 1347 by Mela Mcadams RN  Outcome: Progressing  4/25/2024 0057 by Ted Gaspar RN  Outcome: Progressing     Problem: Safety - Adult  Goal: Free from fall injury  4/25/2024 1347 by Mela Mcadams RN  Outcome: Progressing  4/25/2024 0057 by Ted Gaspar RN  Outcome: Progressing

## 2024-04-25 NOTE — PROGRESS NOTES
4/25/2024        RE: Arminda Can         [address] [facility] [facility] [address]          To Whom It May Concern,      Due to medical reasons, Arminda Can has been hospitalized from 4/21/2024 until 4/25/2024 and should remain out of work until 5/4/2024        Sincerely,        GABRIELA Doss

## 2024-04-25 NOTE — PROGRESS NOTES
Patient had CT AP done with oral and IV contrast. Taken to CT via wheelchair by this nurse. Return to unit in good condition and consent placed in file

## 2024-04-25 NOTE — CARE COORDINATION
Patient with discharge orders home today self care.     Transition of Care Plan:    RUR: 4%  Prior Level of Functioning: ind  Disposition: home  If SNF or IPR: Date FOC offered: na  Date FOC received: na  Accepting facility: na  Date authorization started with reference number: na  Date authorization received and expires: na  Follow up appointments:   DME needed: na  Transportation at discharge: family/friend   IM/IMM Medicare/ letter given: na  Is patient a [facility] and connected with [facility]? na   If yes, was [facility] transfer form completed and [facility] notified? na  Caregiver Contact: na  Discharge Caregiver contacted prior to discharge? na  Care Conference needed? na  Barriers to discharge: na